# Patient Record
Sex: MALE | Race: OTHER | HISPANIC OR LATINO | Employment: FULL TIME | URBAN - METROPOLITAN AREA
[De-identification: names, ages, dates, MRNs, and addresses within clinical notes are randomized per-mention and may not be internally consistent; named-entity substitution may affect disease eponyms.]

---

## 2024-10-30 ENCOUNTER — HOSPITAL ENCOUNTER (OUTPATIENT)
Dept: LAB | Facility: MEDICAL CENTER | Age: 39
End: 2024-10-30
Attending: NURSE PRACTITIONER
Payer: COMMERCIAL

## 2024-10-30 LAB
BASOPHILS # BLD AUTO: 1 % (ref 0–1.8)
BASOPHILS # BLD: 0.03 K/UL (ref 0–0.12)
CRP SERPL HS-MCNC: 0.4 MG/L (ref 0–3)
EOSINOPHIL # BLD AUTO: 0.07 K/UL (ref 0–0.51)
EOSINOPHIL NFR BLD: 2.3 % (ref 0–6.9)
ERYTHROCYTE [DISTWIDTH] IN BLOOD BY AUTOMATED COUNT: 41.2 FL (ref 35.9–50)
FOLATE SERPL-MCNC: 22.9 NG/ML
HCT VFR BLD AUTO: 46.3 % (ref 42–52)
HGB BLD-MCNC: 15.7 G/DL (ref 14–18)
IMM GRANULOCYTES # BLD AUTO: 0.01 K/UL (ref 0–0.11)
IMM GRANULOCYTES NFR BLD AUTO: 0.3 % (ref 0–0.9)
LYMPHOCYTES # BLD AUTO: 1.01 K/UL (ref 1–4.8)
LYMPHOCYTES NFR BLD: 32.5 % (ref 22–41)
MCH RBC QN AUTO: 29.8 PG (ref 27–33)
MCHC RBC AUTO-ENTMCNC: 33.9 G/DL (ref 32.3–36.5)
MCV RBC AUTO: 88 FL (ref 81.4–97.8)
MONOCYTES # BLD AUTO: 0.19 K/UL (ref 0–0.85)
MONOCYTES NFR BLD AUTO: 6.1 % (ref 0–13.4)
NEUTROPHILS # BLD AUTO: 1.8 K/UL (ref 1.82–7.42)
NEUTROPHILS NFR BLD: 57.8 % (ref 44–72)
NRBC # BLD AUTO: 0 K/UL
NRBC BLD-RTO: 0 /100 WBC (ref 0–0.2)
PLATELET # BLD AUTO: 189 K/UL (ref 164–446)
PMV BLD AUTO: 11.4 FL (ref 9–12.9)
RBC # BLD AUTO: 5.26 M/UL (ref 4.7–6.1)
TSH SERPL DL<=0.005 MIU/L-ACNC: 0.63 UIU/ML (ref 0.38–5.33)
VIT B12 SERPL-MCNC: 658 PG/ML (ref 211–911)
WBC # BLD AUTO: 3.1 K/UL (ref 4.8–10.8)

## 2024-10-30 PROCEDURE — 36415 COLL VENOUS BLD VENIPUNCTURE: CPT

## 2024-10-30 PROCEDURE — 82607 VITAMIN B-12: CPT

## 2024-10-30 PROCEDURE — 86038 ANTINUCLEAR ANTIBODIES: CPT

## 2024-10-30 PROCEDURE — 84443 ASSAY THYROID STIM HORMONE: CPT

## 2024-10-30 PROCEDURE — 85025 COMPLETE CBC W/AUTO DIFF WBC: CPT

## 2024-10-30 PROCEDURE — 86141 C-REACTIVE PROTEIN HS: CPT

## 2024-10-30 PROCEDURE — 82746 ASSAY OF FOLIC ACID SERUM: CPT

## 2024-10-31 LAB — NUCLEAR IGG SER QL IA: NORMAL

## 2025-01-10 ENCOUNTER — OFFICE VISIT (OUTPATIENT)
Dept: URGENT CARE | Facility: CLINIC | Age: 40
End: 2025-01-10
Payer: COMMERCIAL

## 2025-01-10 VITALS
RESPIRATION RATE: 17 BRPM | TEMPERATURE: 97.9 F | BODY MASS INDEX: 25.61 KG/M2 | SYSTOLIC BLOOD PRESSURE: 112 MMHG | DIASTOLIC BLOOD PRESSURE: 76 MMHG | HEIGHT: 68 IN | WEIGHT: 169 LBS | HEART RATE: 94 BPM | OXYGEN SATURATION: 97 %

## 2025-01-10 DIAGNOSIS — J01.10 ACUTE NON-RECURRENT FRONTAL SINUSITIS: ICD-10-CM

## 2025-01-10 PROCEDURE — 99203 OFFICE O/P NEW LOW 30 MIN: CPT | Performed by: PHYSICIAN ASSISTANT

## 2025-01-10 PROCEDURE — 3078F DIAST BP <80 MM HG: CPT | Performed by: PHYSICIAN ASSISTANT

## 2025-01-10 PROCEDURE — 3074F SYST BP LT 130 MM HG: CPT | Performed by: PHYSICIAN ASSISTANT

## 2025-01-10 RX ORDER — METHYLPREDNISOLONE 4 MG/1
TABLET ORAL
Qty: 21 TABLET | Refills: 0 | Status: SHIPPED | OUTPATIENT
Start: 2025-01-10

## 2025-01-10 ASSESSMENT — ENCOUNTER SYMPTOMS
SHORTNESS OF BREATH: 0
ABDOMINAL PAIN: 0
WHEEZING: 0
CHILLS: 0
DIARRHEA: 0
COUGH: 1
NAUSEA: 0
SINUS PAIN: 1
SPUTUM PRODUCTION: 1
VOMITING: 0
FEVER: 0

## 2025-01-10 NOTE — PROGRESS NOTES
"  Subjective:     Alejandro Chandra  is a 39 y.o. male who presents for Nasal Congestion (Ear pain, headache, forehead pain x15 days)    Visit completed with use of translating software  Sinusitis  The current episode started 1 to 4 weeks ago. Associated symptoms include congestion and coughing. Pertinent negatives include no chills, ear pain (fullness w/o pain) or shortness of breath.   Patient presents urgent care noting last 2 to 3 weeks of symptoms of frontal sinus congestion and pressure.  Has had small amount of purulent drainage.  Notes fullness to bilateral ears without pain.  Complains of cough with some production of phlegm.  Denies sore throat.  Denies fevers or chills.  Denies vomiting abdominal pain or diarrhea.    Review of Systems   Constitutional:  Negative for chills and fever.   HENT:  Positive for congestion and sinus pain. Negative for ear pain (fullness w/o pain).    Respiratory:  Positive for cough and sputum production. Negative for shortness of breath and wheezing.    Gastrointestinal:  Negative for abdominal pain, diarrhea, nausea and vomiting.   Skin:  Negative for rash.       Medications:    This patient does not have an active medication from one of the medication groupers.    Allergies: Patient has no known allergies.    Problem List: Alejandro Chandra does not have a problem list on file.    Surgical History:  No past surgical history on file.    Past Social Hx: Alejandro Chandra  reports that he has never smoked. He has never used smokeless tobacco. He reports current alcohol use. He reports that he does not use drugs.     Past Family Hx:  Alejandro Chandra family history is not on file.     Problem list, medications, and allergies reviewed by myself today in Epic.     Objective:   /76   Pulse 94   Temp 36.6 °C (97.9 °F) (Temporal)   Resp 17   Ht 1.727 m (5' 8\")   Wt 76.7 kg (169 lb)   SpO2 97%   BMI 25.70 kg/m²     Physical Exam  Vitals and nursing note reviewed. "   Constitutional:       General: He is not in acute distress.     Appearance: Normal appearance. He is well-developed. He is not diaphoretic.   HENT:      Head: Normocephalic and atraumatic.      Right Ear: Ear canal and external ear normal. Tympanic membrane is bulging. Tympanic membrane is not erythematous.      Left Ear: Ear canal and external ear normal. Tympanic membrane is bulging. Tympanic membrane is not erythematous.      Nose:      Right Sinus: Maxillary sinus tenderness and frontal sinus tenderness present.      Left Sinus: Maxillary sinus tenderness and frontal sinus tenderness present.      Mouth/Throat:      Lips: Pink.      Mouth: Mucous membranes are moist.      Pharynx: Uvula midline. Posterior oropharyngeal erythema ( PND) present. No oropharyngeal exudate or uvula swelling.      Tonsils: No tonsillar abscesses.   Eyes:      General: No scleral icterus.        Right eye: No discharge.         Left eye: No discharge.      Conjunctiva/sclera: Conjunctivae normal.   Pulmonary:      Effort: Pulmonary effort is normal. No respiratory distress.      Breath sounds: Normal breath sounds. No stridor. No decreased breath sounds, wheezing, rhonchi or rales.   Musculoskeletal:         General: Normal range of motion.      Cervical back: Neck supple.   Skin:     General: Skin is warm and dry.      Coloration: Skin is not pale.   Neurological:      Mental Status: He is alert and oriented to person, place, and time.      Coordination: Coordination normal.         Assessment/Plan:   Assessment      1. Acute non-recurrent frontal sinusitis  - amoxicillin-clavulanate (AUGMENTIN) 875-125 MG Tab; Take 1 Tablet by mouth 2 times a day for 7 days.  Dispense: 14 Tablet; Refill: 0  - methylPREDNISolone (MEDROL DOSEPAK) 4 MG Tablet Therapy Pack; Follow schedule on package instructions.  Dispense: 21 Tablet; Refill: 0  Supportive care is reviewed with patient/caregiver - recommend to push PO fluids and electrolytes,  Cautioned regarding potential side effects of steroid, avoid nsaids while using take full course of Rx, take with probiotics, observe for resolution  Return to clinic with lack of resolution or progression of symptoms.   take full course of Rx, take with probiotics, observe for resolution  Return to clinic with lack of resolution or progression of symptoms.    I have worn an N95 mask, gloves and eye protection for the entire encounter with this patient.     Differential diagnosis, natural history, supportive care, and indications for immediate follow-up discussed.

## 2025-06-13 ENCOUNTER — APPOINTMENT (OUTPATIENT)
Dept: RADIOLOGY | Facility: IMAGING CENTER | Age: 40
End: 2025-06-13
Attending: NURSE PRACTITIONER
Payer: COMMERCIAL

## 2025-06-13 ENCOUNTER — OFFICE VISIT (OUTPATIENT)
Dept: URGENT CARE | Facility: CLINIC | Age: 40
End: 2025-06-13
Payer: COMMERCIAL

## 2025-06-13 VITALS
BODY MASS INDEX: 25.01 KG/M2 | TEMPERATURE: 97.8 F | WEIGHT: 165 LBS | DIASTOLIC BLOOD PRESSURE: 76 MMHG | RESPIRATION RATE: 16 BRPM | OXYGEN SATURATION: 96 % | HEIGHT: 68 IN | HEART RATE: 125 BPM | SYSTOLIC BLOOD PRESSURE: 124 MMHG

## 2025-06-13 DIAGNOSIS — S99.911A INJURY OF RIGHT ANKLE, INITIAL ENCOUNTER: Primary | ICD-10-CM

## 2025-06-13 DIAGNOSIS — S99.911A INJURY OF RIGHT ANKLE, INITIAL ENCOUNTER: ICD-10-CM

## 2025-06-13 PROCEDURE — 3074F SYST BP LT 130 MM HG: CPT | Performed by: NURSE PRACTITIONER

## 2025-06-13 PROCEDURE — 3078F DIAST BP <80 MM HG: CPT | Performed by: NURSE PRACTITIONER

## 2025-06-13 PROCEDURE — 99213 OFFICE O/P EST LOW 20 MIN: CPT | Performed by: NURSE PRACTITIONER

## 2025-06-13 PROCEDURE — 73610 X-RAY EXAM OF ANKLE: CPT | Mod: TC,RT | Performed by: RADIOLOGY

## 2025-06-14 ASSESSMENT — ENCOUNTER SYMPTOMS
LOSS OF SENSATION: 0
LOSS OF MOTION: 0
TINGLING: 0
NUMBNESS: 0

## 2025-06-14 NOTE — PROGRESS NOTES
"Subjective:   Alejandro Chandra is a 39 y.o. male who presents for Ankle Injury (Walking and twisted R ankle )      Ankle Injury   The incident occurred less than 1 hour ago. The incident occurred at the gym. The injury mechanism was an inversion injury. The quality of the pain is described as aching. The pain is moderate. The pain has been Constant since onset. Pertinent negatives include no loss of motion, loss of sensation, numbness or tingling. The symptoms are aggravated by movement. He has tried acetaminophen for the symptoms. The treatment provided no relief.       Review of Systems   Musculoskeletal:  Positive for joint pain.   Neurological:  Negative for tingling and numbness.       Medications:    methylPREDNISolone Tbpk    Allergies: Patient has no known allergies.    Problem List: Alejandro Chandra does not have a problem list on file.    Surgical History:  No past surgical history on file.    Past Social Hx: Alejandro Chandra  reports that he has never smoked. He has never used smokeless tobacco. He reports current alcohol use. He reports that he does not use drugs.     Past Family Hx:  Alejandro Chandra family history is not on file.     Problem list, medications, and allergies reviewed by myself today in Epic.     Objective:     /76   Pulse (!) 125   Temp 36.6 °C (97.8 °F)   Resp 16   Ht 1.727 m (5' 8\")   Wt 74.8 kg (165 lb)   SpO2 96%   BMI 25.09 kg/m²     Physical Exam  Constitutional:       Appearance: Normal appearance. He is not ill-appearing or toxic-appearing.   HENT:      Head: Normocephalic.      Right Ear: External ear normal.      Left Ear: External ear normal.      Nose: Nose normal.      Mouth/Throat:      Lips: Pink.   Eyes:      General: Lids are normal.   Pulmonary:      Effort: Pulmonary effort is normal. No accessory muscle usage.   Musculoskeletal:      Cervical back: Full passive range of motion without pain.      Right foot: Decreased range of motion. Normal capillary refill. " Swelling, laceration, tenderness and bony tenderness present. No prominent metatarsal heads or crepitus.   Neurological:      Mental Status: He is alert and oriented to person, place, and time.   Psychiatric:         Mood and Affect: Mood normal.         Thought Content: Thought content normal.         Assessment/Plan:     Diagnosis and associated orders:     1. Injury of right ankle, initial encounter  DX-ANKLE 3+ VIEWS RIGHT    Referral to Sports Medicine           Comments/MDM:     I independently reviewed the patient's imaging and agree with the interpretation of the radiologist.    No evidence of fracture or dislocation.       I personally reviewed prior external notes and prior test results pertinent to today's visit.  Ankle brace and crutches provided.  Follow-up with special  Discussed management options, risks and benefits, and alternatives to treatment plan agreed upon.   Red flags discussed and indications to immediately call 911 or present to the Emergency Department.   Supportive care, differential diagnoses, and indications for immediate follow-up discussed with patient.    Patient expresses understanding and agrees to plan. Patient denies any other questions or concerns.                  Please note that this dictation was created using voice recognition software. I have made a reasonable attempt to correct obvious errors, but I expect that there are errors of grammar and possibly content that I did not discover before finalizing the note.    This note was electronically signed by Asher ORTEGA.